# Patient Record
Sex: FEMALE | Race: WHITE | NOT HISPANIC OR LATINO | URBAN - METROPOLITAN AREA
[De-identification: names, ages, dates, MRNs, and addresses within clinical notes are randomized per-mention and may not be internally consistent; named-entity substitution may affect disease eponyms.]

---

## 2017-09-26 ENCOUNTER — EMERGENCY (EMERGENCY)
Facility: HOSPITAL | Age: 32
LOS: 1 days | Discharge: HOME | End: 2017-09-26
Attending: EMERGENCY MEDICINE | Admitting: EMERGENCY MEDICINE
Payer: COMMERCIAL

## 2017-09-26 VITALS
WEIGHT: 145.95 LBS | OXYGEN SATURATION: 98 % | HEART RATE: 106 BPM | DIASTOLIC BLOOD PRESSURE: 75 MMHG | RESPIRATION RATE: 16 BRPM | TEMPERATURE: 98 F | SYSTOLIC BLOOD PRESSURE: 102 MMHG | HEIGHT: 64 IN

## 2017-09-26 PROCEDURE — 99283 EMERGENCY DEPT VISIT LOW MDM: CPT
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
## 2017-09-26 NOTE — ED STATDOCS - CONSTITUTIONAL, MLM
normal... well appearing, well nourished, and in no apparent distress. no signs of trauma to the head or the abd
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
## 2019-06-10 NOTE — ED STATDOCS - CPE ED EYE NORM
Rash l chest for 4-5 days  No other sx's  Slight pain and itching    RN's note was reviewed.  Past Medical History:   Diagnosis Date   • Chronic renal disease, stage 2, mildly decreased glomerular filtration rate (GFR) between 60-89 mL/min/1.73 square meter 05/03/2017   • Essential hypertension, benign     not currently on medication.  was doing well off meds for a while.     • Hyperlipidemia        Past Surgical History:   Procedure Laterality Date   • Colonoscopy  01/27/2017    Colonoscopy-normal, 6 year follow up   • Knee scope,aid ant cruciate repair  9-17-03    Dr Jin GREEN     meds reviewed  O; grouped erythematous vesicular lesions r chest t 6 dermatome    Ap shngles  See avs  Current Outpatient Medications   Medication Sig   • amLODIPine (NORVASC) 10 MG tablet Take 1 tablet by mouth daily.   • Valacyclovir HCl (VALTREX) 1000 MG Tab Take 1 tablet by mouth 3 times daily for 7 days.   • Multiple Vitamins-Minerals (CENTRUM) tablet Take 1 tablet by mouth daily.     No current facility-administered medications for this visit.      Caladryl  F/u pmd 10 daysif not healed or in 3-4 weeks if still discomfortable   bilateral normal...

## 2023-01-12 ENCOUNTER — OFFICE (OUTPATIENT)
Dept: URBAN - METROPOLITAN AREA CLINIC 12 | Facility: CLINIC | Age: 38
Setting detail: OPHTHALMOLOGY
End: 2023-01-12
Payer: COMMERCIAL

## 2023-01-12 DIAGNOSIS — H01.004: ICD-10-CM

## 2023-01-12 DIAGNOSIS — H01.001: ICD-10-CM

## 2023-01-12 DIAGNOSIS — H43.393: ICD-10-CM

## 2023-01-12 PROCEDURE — 92014 COMPRE OPH EXAM EST PT 1/>: CPT | Performed by: OPTOMETRIST

## 2023-01-12 ASSESSMENT — TONOMETRY
OD_IOP_MMHG: 19
OS_IOP_MMHG: 20

## 2023-01-12 ASSESSMENT — REFRACTION_MANIFEST
OS_SPHERE: -6.00
OD_AXIS: 060
OD_CYLINDER: -0.25
OS_SPHERE: -6.00
OS_VA1: 20/20
OD_VA1: 20/20-
OD_SPHERE: -6.50
OS_CYLINDER: -1.75
OD_VA1: 20/25+
OS_AXIS: 076
OD_CYLINDER: -0.25
OS_CYLINDER: -1.75
OS_AXIS: 073
OS_VA1: 20/20-
OD_AXIS: 033
OD_SPHERE: -7.25

## 2023-01-12 ASSESSMENT — KERATOMETRY
OD_AXISANGLE_DEGREES: 082
OS_AXISANGLE_DEGREES: 121
OS_K2POWER_DIOPTERS: 47.25
OS_K1POWER_DIOPTERS: 47.00
OD_K2POWER_DIOPTERS: 47.25
OD_K1POWER_DIOPTERS: 46.75

## 2023-01-12 ASSESSMENT — LID EXAM ASSESSMENTS
OD_BLEPHARITIS: T
OS_BLEPHARITIS: T

## 2023-01-12 ASSESSMENT — REFRACTION_CURRENTRX
OD_AXIS: 058
OD_OVR_VA: 20/
OS_CYLINDER: -1.75
OS_AXIS: 074
OD_SPHERE: -6.75
OS_SPHERE: -6.25
OS_VPRISM_DIRECTION: SV
OD_VPRISM_DIRECTION: SV
OD_CYLINDER: -0.25
OS_OVR_VA: 20/

## 2023-01-12 ASSESSMENT — SPHEQUIV_DERIVED
OD_SPHEQUIV: -6.625
OD_SPHEQUIV: -7.375
OD_SPHEQUIV: -7.125
OS_SPHEQUIV: -7
OS_SPHEQUIV: -6.875
OS_SPHEQUIV: -6.875

## 2023-01-12 ASSESSMENT — CONFRONTATIONAL VISUAL FIELD TEST (CVF)
OD_FINDINGS: FULL
OS_FINDINGS: FULL

## 2023-01-12 ASSESSMENT — AXIALLENGTH_DERIVED
OD_AL: 25.1828
OD_AL: 25.2942
OS_AL: 25.0205
OD_AL: 24.9627
OS_AL: 25.0205
OS_AL: 25.0754

## 2023-01-12 ASSESSMENT — REFRACTION_AUTOREFRACTION
OS_CYLINDER: -1.50
OS_SPHERE: -6.25
OD_AXIS: 033
OD_SPHERE: -7.00
OS_AXIS: 068
OD_CYLINDER: -0.25

## 2023-01-12 ASSESSMENT — VISUAL ACUITY
OD_BCVA: 20/20
OS_BCVA: 20/20

## 2023-05-30 NOTE — ED ADULT TRIAGE NOTE - BMI (KG/M2)
Pt has appt on 5/31, needs approval for referral [id number] by end of day.     Pls fax when authorized 25.1

## 2024-01-11 ENCOUNTER — OFFICE (OUTPATIENT)
Dept: URBAN - METROPOLITAN AREA CLINIC 12 | Facility: CLINIC | Age: 39
Setting detail: OPHTHALMOLOGY
End: 2024-01-11
Payer: COMMERCIAL

## 2024-01-11 DIAGNOSIS — H01.001: ICD-10-CM

## 2024-01-11 DIAGNOSIS — H01.004: ICD-10-CM

## 2024-01-11 DIAGNOSIS — H43.393: ICD-10-CM

## 2024-01-11 PROCEDURE — 92014 COMPRE OPH EXAM EST PT 1/>: CPT | Performed by: OPTOMETRIST

## 2024-01-11 ASSESSMENT — REFRACTION_MANIFEST
OS_SPHERE: -6.00
OS_VA1: 20/20
OD_SPHERE: -6.50
OD_SPHERE: -7.25
OS_CYLINDER: -1.75
OS_VA1: 20/20-
OD_CYLINDER: -0.25
OD_CYLINDER: -0.25
OD_AXIS: 060
OS_AXIS: 073
OD_VA1: 20/25+
OS_CYLINDER: -1.75
OS_SPHERE: -6.00
OD_AXIS: 033
OS_AXIS: 076
OD_VA1: 20/20-

## 2024-01-11 ASSESSMENT — REFRACTION_AUTOREFRACTION
OD_AXIS: 067
OD_SPHERE: -7.25
OS_SPHERE: -6.50
OD_CYLINDER: -0.25
OS_CYLINDER: -1.50
OS_AXIS: 070

## 2024-01-11 ASSESSMENT — REFRACTION_CURRENTRX
OD_VPRISM_DIRECTION: SV
OD_OVR_VA: 20/
OS_OVR_VA: 20/
OS_SPHERE: -6.25
OD_SPHERE: -6.75
OS_VPRISM_DIRECTION: SV
OS_CYLINDER: -1.75
OD_AXIS: 058
OD_CYLINDER: -0.25
OS_AXIS: 074

## 2024-01-11 ASSESSMENT — SPHEQUIV_DERIVED
OS_SPHEQUIV: -6.875
OS_SPHEQUIV: -6.875
OS_SPHEQUIV: -7.25
OD_SPHEQUIV: -7.375
OD_SPHEQUIV: -6.625
OD_SPHEQUIV: -7.375

## 2024-01-11 ASSESSMENT — LID EXAM ASSESSMENTS
OD_BLEPHARITIS: T
OS_BLEPHARITIS: T

## 2024-01-11 ASSESSMENT — CONFRONTATIONAL VISUAL FIELD TEST (CVF)
OS_FINDINGS: FULL
OD_FINDINGS: FULL

## 2025-01-13 ENCOUNTER — OFFICE (OUTPATIENT)
Dept: URBAN - METROPOLITAN AREA CLINIC 12 | Facility: CLINIC | Age: 40
Setting detail: OPHTHALMOLOGY
End: 2025-01-13
Payer: COMMERCIAL

## 2025-01-13 DIAGNOSIS — H52.13: ICD-10-CM

## 2025-01-13 DIAGNOSIS — H01.004: ICD-10-CM

## 2025-01-13 DIAGNOSIS — H43.393: ICD-10-CM

## 2025-01-13 DIAGNOSIS — H01.001: ICD-10-CM

## 2025-01-13 PROCEDURE — 92014 COMPRE OPH EXAM EST PT 1/>: CPT | Performed by: OPTOMETRIST

## 2025-01-13 PROCEDURE — 92015 DETERMINE REFRACTIVE STATE: CPT | Performed by: OPTOMETRIST

## 2025-01-13 ASSESSMENT — KERATOMETRY
OD_K1POWER_DIOPTERS: 46.50
OD_K2POWER_DIOPTERS: 47.00
OS_K1POWER_DIOPTERS: 46.50
OD_AXISANGLE_DEGREES: 83
OS_K2POWER_DIOPTERS: 47.25
OS_AXISANGLE_DEGREES: 112

## 2025-01-13 ASSESSMENT — REFRACTION_AUTOREFRACTION
OS_AXIS: 72
OD_CYLINDER: -0.25
OD_AXIS: 63
OS_SPHERE: -6.25
OD_SPHERE: -7.00
OS_CYLINDER: -1.50

## 2025-01-13 ASSESSMENT — REFRACTION_MANIFEST
OD_VA1: 20/20
OS_VA1: 20/20
OD_CYLINDER: -0.25
OS_VA1: 20/20-
OD_VA1: 20/20-
OD_VA1: 20/25+
OS_SPHERE: -6.25
OD_SPHERE: -7.25
OD_AXIS: 060
OD_CYLINDER: -0.25
OS_AXIS: 073
OS_CYLINDER: -1.75
OS_AXIS: 076
OS_CYLINDER: -1.50
OD_AXIS: 060
OS_SPHERE: -6.00
OD_SPHERE: -6.75
OD_AXIS: 033
OS_VA1: 20/20
OS_CYLINDER: -1.75
OD_CYLINDER: -0.25
OS_SPHERE: -6.00
OD_SPHERE: -6.50
OS_AXIS: 073

## 2025-01-13 ASSESSMENT — REFRACTION_CURRENTRX
OS_AXIS: 79
OD_CYLINDER: -0.50
OS_SPHERE: -6.00
OS_CYLINDER: -1.75
OD_OVR_VA: 20/
OD_AXIS: 64
OS_OVR_VA: 20/
OS_VPRISM_DIRECTION: SV
OD_VPRISM_DIRECTION: SV
OD_SPHERE: -6.50

## 2025-01-13 ASSESSMENT — CONFRONTATIONAL VISUAL FIELD TEST (CVF)
OS_FINDINGS: FULL
OD_FINDINGS: FULL

## 2025-01-13 ASSESSMENT — LID EXAM ASSESSMENTS
OD_BLEPHARITIS: T
OS_BLEPHARITIS: T

## 2025-01-13 ASSESSMENT — TONOMETRY
OS_IOP_MMHG: 19
OD_IOP_MMHG: 20

## 2025-01-13 ASSESSMENT — VISUAL ACUITY
OD_BCVA: 20/20
OS_BCVA: 20/20